# Patient Record
Sex: MALE | Race: WHITE | NOT HISPANIC OR LATINO
[De-identification: names, ages, dates, MRNs, and addresses within clinical notes are randomized per-mention and may not be internally consistent; named-entity substitution may affect disease eponyms.]

---

## 2023-07-12 PROBLEM — Z00.00 ENCOUNTER FOR PREVENTIVE HEALTH EXAMINATION: Status: ACTIVE | Noted: 2023-07-12

## 2023-07-13 ENCOUNTER — APPOINTMENT (OUTPATIENT)
Dept: OTOLARYNGOLOGY | Facility: CLINIC | Age: 33
End: 2023-07-13
Payer: COMMERCIAL

## 2023-07-13 ENCOUNTER — NON-APPOINTMENT (OUTPATIENT)
Age: 33
End: 2023-07-13

## 2023-07-13 VITALS
HEIGHT: 72 IN | DIASTOLIC BLOOD PRESSURE: 96 MMHG | WEIGHT: 240 LBS | BODY MASS INDEX: 32.51 KG/M2 | TEMPERATURE: 96.7 F | SYSTOLIC BLOOD PRESSURE: 138 MMHG | HEART RATE: 86 BPM

## 2023-07-13 DIAGNOSIS — R07.0 PAIN IN THROAT: ICD-10-CM

## 2023-07-13 DIAGNOSIS — Z83.3 FAMILY HISTORY OF DIABETES MELLITUS: ICD-10-CM

## 2023-07-13 DIAGNOSIS — Z87.19 PERSONAL HISTORY OF OTHER DISEASES OF THE DIGESTIVE SYSTEM: ICD-10-CM

## 2023-07-13 DIAGNOSIS — K21.9 GASTRO-ESOPHAGEAL REFLUX DISEASE W/OUT ESOPHAGITIS: ICD-10-CM

## 2023-07-13 DIAGNOSIS — R09.89 OTHER SPECIFIED SYMPTOMS AND SIGNS INVOLVING THE CIRCULATORY AND RESPIRATORY SYSTEMS: ICD-10-CM

## 2023-07-13 PROCEDURE — 99205 OFFICE O/P NEW HI 60 MIN: CPT | Mod: 25

## 2023-07-13 PROCEDURE — 31579 LARYNGOSCOPY TELESCOPIC: CPT

## 2023-07-13 RX ORDER — FAMOTIDINE 20 MG/1
20 TABLET, FILM COATED ORAL
Qty: 90 | Refills: 1 | Status: ACTIVE | COMMUNITY
Start: 2023-07-13 | End: 1900-01-01

## 2023-07-13 NOTE — PROCEDURE
[de-identified] : -\par Procedure: Flexible Laryngoscopy with Stroboscopy\par \par Pre-operative Diagnosis: throat discomfort \par Post-operative Diagnosis: right TVF fullness, laryngopharyngeal reflux \par Anesthesia: Topical - 1% Lidocaine/Phenylephrine \par \par Procedure Details: \par The patient was placed in the sitting position. After decongestant and anesthesia were applied the laryngoscope was passed. The nasal cavities, nasopharynx, oropharynx, hypopharynx, and larynx were all examined. Vocal folds were examined during respiration and phonation. The following findings were noted:\par \par Findings: \par Nose: Septum is midline, turbinates are normal, nasal airways patent, mucosa normal\par Nasopharynx: Adenoids normal, no masses, eustachian tube normal\par Oropharynx: Pharyngeal walls symmetric and without lesion. Tonsils/fossae symmetric\par Hypopharynx: Hypopharynx and pyriform sinuses without lesion. No masses or asymmetry. No pooling of secretions.\par Larynx: Epiglottis and aryepiglottic folds were sharp and crisp bilaterally. Bilateral false vocal folds normal appearance. Airway was widely patent. + post cricoid edema\par \par Strobe Exam Ratings\par 		\par TVF Appearance: right vocal fold fullness and dryness\par TVF Mobility: normal\par Edema/hypertrophy: none\par Mucus on TVF: none\par Glottic Closure: adequate\par Mucosal Wave: normal \par Amplitude of Vibration: normal \par Phase: slightly asymmetric\par Supraglottic Hyperfunction: none\par Other Findings:\par \par Condition: Stable. Patient tolerated procedure well.\par \par Complications: None\par \par

## 2023-07-13 NOTE — ASSESSMENT
[FreeTextEntry1] : 32 year old male  presents with concern for right sided throat discomfort x 4 months when singing. Based on history and physical exam, I do believe there is a component of laryngopharyngeal reflux. At this time I am recommending dietary and behavioral change to reduce acid in the diet. I am also recommending famotidine for a 3 months trial. I am also recommending consultation with SLP. Follow up 3 months for repeat evaluation. \par \par Plan: \par - Dietary and behavioral modification to reduce acid reflux, handout given \par - Famotidine qhs \par - Voice hygiene, increase hydration, sips of water throughout the day, avoid throat clearing\par - SLP consult\par - fu 3 months \par

## 2023-07-13 NOTE — HISTORY OF PRESENT ILLNESS
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 Symptoms started about 4 months ago. \par Reports that he feels a dull ache, "swelling" and "tugging" on the right side of his throat during singing and/or after singing. Worse in evening and hot weather with associated sensation of difficulty breathing when laying down. Denies any changes in voice. Denies difficulty chewing, eating or swallowing. He sings every day, 30 mins-4hrs. No hx of acid reflux. \par \par Diet:\par + 2 cups Caffeine, chocolate, tomato, citrus, garlic, onion, spicy foods, carbonated beverages\par - mint, blueberries\par Glasses of water per day: 4 glasses\par Late night eating: yes\par Alcohol use, especially at night: no\par \par

## 2023-07-13 NOTE — PHYSICAL EXAM
[Midline] : trachea located in midline position [Laryngoscopy Performed] : laryngoscopy was performed, see procedure section for findings [Normal] : no rashes [FreeTextEntry1] : Normal clarity, range, pitch control and projection

## 2023-08-17 ENCOUNTER — APPOINTMENT (OUTPATIENT)
Dept: OTOLARYNGOLOGY | Facility: CLINIC | Age: 33
End: 2023-08-17
Payer: COMMERCIAL

## 2023-08-17 PROCEDURE — 92524 BEHAVRAL QUALIT ANALYS VOICE: CPT | Mod: GN

## 2023-08-17 PROCEDURE — 31579 LARYNGOSCOPY TELESCOPIC: CPT

## 2023-08-31 ENCOUNTER — APPOINTMENT (OUTPATIENT)
Dept: OTOLARYNGOLOGY | Facility: CLINIC | Age: 33
End: 2023-08-31
Payer: COMMERCIAL

## 2023-08-31 PROCEDURE — 92507 TX SP LANG VOICE COMM INDIV: CPT | Mod: GN

## 2023-09-12 ENCOUNTER — TRANSCRIPTION ENCOUNTER (OUTPATIENT)
Age: 33
End: 2023-09-12

## 2023-09-18 ENCOUNTER — APPOINTMENT (OUTPATIENT)
Dept: OTOLARYNGOLOGY | Facility: CLINIC | Age: 33
End: 2023-09-18
Payer: COMMERCIAL

## 2023-09-18 PROCEDURE — 92507 TX SP LANG VOICE COMM INDIV: CPT | Mod: GN

## 2023-10-12 ENCOUNTER — APPOINTMENT (OUTPATIENT)
Dept: OTOLARYNGOLOGY | Facility: CLINIC | Age: 33
End: 2023-10-12
Payer: COMMERCIAL

## 2023-10-12 PROCEDURE — 92507 TX SP LANG VOICE COMM INDIV: CPT | Mod: GN
